# Patient Record
Sex: FEMALE | Race: WHITE | NOT HISPANIC OR LATINO | Employment: UNEMPLOYED | URBAN - METROPOLITAN AREA
[De-identification: names, ages, dates, MRNs, and addresses within clinical notes are randomized per-mention and may not be internally consistent; named-entity substitution may affect disease eponyms.]

---

## 2023-05-08 ENCOUNTER — OFFICE VISIT (OUTPATIENT)
Dept: PRIMARY CARE | Facility: CLINIC | Age: 83
End: 2023-05-08
Payer: MEDICARE

## 2023-05-08 VITALS
BODY MASS INDEX: 23.21 KG/M2 | HEIGHT: 63 IN | DIASTOLIC BLOOD PRESSURE: 63 MMHG | HEART RATE: 75 BPM | WEIGHT: 131 LBS | SYSTOLIC BLOOD PRESSURE: 105 MMHG | OXYGEN SATURATION: 98 %

## 2023-05-08 DIAGNOSIS — Z00.00 ROUTINE GENERAL MEDICAL EXAMINATION AT HEALTH CARE FACILITY: Primary | ICD-10-CM

## 2023-05-08 DIAGNOSIS — D69.6 THROMBOCYTOPENIA (CMS-HCC): ICD-10-CM

## 2023-05-08 DIAGNOSIS — C85.88 MARGINAL ZONE LYMPHOMA OF LYMPH NODES OF MULTIPLE SITES (MULTI): ICD-10-CM

## 2023-05-08 DIAGNOSIS — E55.9 VITAMIN D DEFICIENCY: ICD-10-CM

## 2023-05-08 DIAGNOSIS — I48.20 CHRONIC ATRIAL FIBRILLATION (MULTI): ICD-10-CM

## 2023-05-08 DIAGNOSIS — Z00.00 MEDICARE ANNUAL WELLNESS VISIT, SUBSEQUENT: ICD-10-CM

## 2023-05-08 DIAGNOSIS — Z12.31 ENCOUNTER FOR SCREENING MAMMOGRAM FOR BREAST CANCER: ICD-10-CM

## 2023-05-08 DIAGNOSIS — F41.9 ANXIETY: ICD-10-CM

## 2023-05-08 DIAGNOSIS — D61.818 PANCYTOPENIA (MULTI): ICD-10-CM

## 2023-05-08 DIAGNOSIS — E75.5 OTHER LIPID STORAGE DISORDERS: ICD-10-CM

## 2023-05-08 DIAGNOSIS — M71.20: ICD-10-CM

## 2023-05-08 DIAGNOSIS — G47.33 OBSTRUCTIVE SLEEP APNEA SYNDROME: ICD-10-CM

## 2023-05-08 DIAGNOSIS — R73.9 ELEVATED BLOOD SUGAR: ICD-10-CM

## 2023-05-08 DIAGNOSIS — M80.051D AGE-RELATED OSTEOPOROSIS WITH CURRENT PATHOL FRACTURE OF RIGHT FEMUR, WITH ROUTINE HEALING, SUBSEQUENT ENCOUNTER: ICD-10-CM

## 2023-05-08 PROBLEM — M85.80 OSTEOPENIA: Status: RESOLVED | Noted: 2023-05-08 | Resolved: 2023-05-08

## 2023-05-08 PROBLEM — R26.81 GAIT INSTABILITY: Status: ACTIVE | Noted: 2022-12-27

## 2023-05-08 PROBLEM — I48.91 ATRIAL FIBRILLATION (MULTI): Status: ACTIVE | Noted: 2023-05-08

## 2023-05-08 PROBLEM — E87.1 HYPONATREMIA: Status: ACTIVE | Noted: 2023-01-17

## 2023-05-08 PROBLEM — H93.13 BILATERAL TINNITUS: Status: ACTIVE | Noted: 2023-05-08

## 2023-05-08 PROBLEM — R59.0 MEDIASTINAL ADENOPATHY: Status: ACTIVE | Noted: 2023-02-24

## 2023-05-08 PROBLEM — H90.3 ASYMMETRICAL SENSORINEURAL HEARING LOSS: Status: ACTIVE | Noted: 2023-05-08

## 2023-05-08 PROBLEM — M17.0 PRIMARY OSTEOARTHRITIS OF BOTH KNEES: Status: ACTIVE | Noted: 2019-03-09

## 2023-05-08 PROBLEM — E04.1 NONTOXIC SINGLE THYROID NODULE: Status: ACTIVE | Noted: 2018-01-21

## 2023-05-08 PROBLEM — M54.2 CERVICALGIA: Status: ACTIVE | Noted: 2023-05-08

## 2023-05-08 PROBLEM — Z86.79 HISTORY OF PAROXYSMAL SUPRAVENTRICULAR TACHYCARDIA: Status: ACTIVE | Noted: 2023-05-08

## 2023-05-08 PROBLEM — M15.9 GENERALIZED OSTEOARTHRITIS: Status: ACTIVE | Noted: 2023-05-08

## 2023-05-08 PROBLEM — M51.36 DDD (DEGENERATIVE DISC DISEASE), LUMBAR: Status: ACTIVE | Noted: 2019-03-09

## 2023-05-08 PROBLEM — G43.909 MIGRAINE HEADACHE: Status: ACTIVE | Noted: 2023-05-08

## 2023-05-08 PROBLEM — J30.9 ALLERGIC RHINITIS: Status: ACTIVE | Noted: 2023-05-08

## 2023-05-08 PROBLEM — M51.369 DDD (DEGENERATIVE DISC DISEASE), LUMBAR: Status: ACTIVE | Noted: 2019-03-09

## 2023-05-08 PROBLEM — M85.80 OSTEOPENIA: Status: ACTIVE | Noted: 2023-05-08

## 2023-05-08 PROCEDURE — 1036F TOBACCO NON-USER: CPT | Performed by: FAMILY MEDICINE

## 2023-05-08 PROCEDURE — 99214 OFFICE O/P EST MOD 30 MIN: CPT | Performed by: FAMILY MEDICINE

## 2023-05-08 PROCEDURE — 1160F RVW MEDS BY RX/DR IN RCRD: CPT | Performed by: FAMILY MEDICINE

## 2023-05-08 PROCEDURE — G0439 PPPS, SUBSEQ VISIT: HCPCS | Performed by: FAMILY MEDICINE

## 2023-05-08 PROCEDURE — 1159F MED LIST DOCD IN RCRD: CPT | Performed by: FAMILY MEDICINE

## 2023-05-08 PROCEDURE — 1170F FXNL STATUS ASSESSED: CPT | Performed by: FAMILY MEDICINE

## 2023-05-08 RX ORDER — VIT C/E/ZN/COPPR/LUTEIN/ZEAXAN 250MG-90MG
CAPSULE ORAL
COMMUNITY

## 2023-05-08 RX ORDER — ACETAMINOPHEN 500 MG
TABLET ORAL 2 TIMES DAILY PRN
COMMUNITY

## 2023-05-08 RX ORDER — ARTIFICIAL TEARS 1; 2; 3 MG/ML; MG/ML; MG/ML
SOLUTION/ DROPS OPHTHALMIC
COMMUNITY

## 2023-05-08 RX ORDER — AMOXICILLIN 500 MG/1
CAPSULE ORAL
COMMUNITY
Start: 2021-06-18

## 2023-05-08 RX ORDER — POTASSIUM CITRATE 10 MEQ/1
10 TABLET, EXTENDED RELEASE ORAL DAILY
COMMUNITY
Start: 2022-12-12 | End: 2023-11-08 | Stop reason: SDUPTHER

## 2023-05-08 RX ORDER — CETIRIZINE HYDROCHLORIDE 10 MG/1
1 TABLET ORAL DAILY
COMMUNITY

## 2023-05-08 RX ORDER — MENTHOL 5.8 MG/1
100 LOZENGE ORAL DAILY
COMMUNITY
Start: 2023-01-30

## 2023-05-08 RX ORDER — ACETAMINOPHEN 500 MG
TABLET ORAL EVERY 6 HOURS PRN
COMMUNITY

## 2023-05-08 RX ORDER — MULTIVIT-MIN/FA/LYCOPEN/LUTEIN .4-300-25
TABLET ORAL
COMMUNITY
Start: 2015-11-24

## 2023-05-08 RX ORDER — FUROSEMIDE 20 MG/1
20 TABLET ORAL
COMMUNITY
Start: 2022-05-09 | End: 2023-11-08 | Stop reason: SDUPTHER

## 2023-05-08 RX ORDER — PAROXETINE 30 MG/1
30 TABLET, FILM COATED ORAL DAILY
COMMUNITY
End: 2023-06-30 | Stop reason: SDUPTHER

## 2023-05-08 ASSESSMENT — PATIENT HEALTH QUESTIONNAIRE - PHQ9
2. FEELING DOWN, DEPRESSED OR HOPELESS: NOT AT ALL
SUM OF ALL RESPONSES TO PHQ9 QUESTIONS 1 AND 2: 0
1. LITTLE INTEREST OR PLEASURE IN DOING THINGS: NOT AT ALL

## 2023-05-08 ASSESSMENT — ENCOUNTER SYMPTOMS
LOSS OF SENSATION IN FEET: 0
DEPRESSION: 0
OCCASIONAL FEELINGS OF UNSTEADINESS: 1

## 2023-05-08 ASSESSMENT — COLUMBIA-SUICIDE SEVERITY RATING SCALE - C-SSRS
6. HAVE YOU EVER DONE ANYTHING, STARTED TO DO ANYTHING, OR PREPARED TO DO ANYTHING TO END YOUR LIFE?: NO
2. HAVE YOU ACTUALLY HAD ANY THOUGHTS OF KILLING YOURSELF?: NO
1. IN THE PAST MONTH, HAVE YOU WISHED YOU WERE DEAD OR WISHED YOU COULD GO TO SLEEP AND NOT WAKE UP?: NO

## 2023-05-08 ASSESSMENT — ACTIVITIES OF DAILY LIVING (ADL)
DOING_HOUSEWORK: INDEPENDENT
DRESSING: INDEPENDENT
MANAGING_FINANCES: INDEPENDENT
GROCERY_SHOPPING: INDEPENDENT
TAKING_MEDICATION: INDEPENDENT
BATHING: INDEPENDENT

## 2023-05-08 NOTE — PATIENT INSTRUCTIONS

## 2023-05-08 NOTE — PROGRESS NOTES
"Subjective   Patient ID: Lela Virgen is a 83 y.o. female who presents for Medicare Annual Wellness Visit Subsequent.      HPI   The patient states that she is taking Paxil for her depression and anxiety, Furosemide for swelling and Xarelto for atrial fibrillation. She is taking these medications as prescribed and is tolerating them well. She is also following up with Hematology for her lymphoma. She states that she fell while using an escalator in December 2023 and injured her arm, but it is currently healing well. She also has some weakness in her lower extremities.    Review of Systems  Constitutional: No fever or chills, No Night Sweats  Eyes: No Blurry Vision or Eye sight problems  ENT: + Nasal Discharge, Hoarseness, sore throat  Cardiovascular: no chest pain, no palpitations and no syncope.   Respiratory: no cough, no shortness of breath during exertion and no shortness of breath at rest.   Gastrointestinal: no abdominal pain, no nausea and no vomiting.   : No vaginal discharge, burning with urination, no blood in urine or stools  Skin: No Skin rashes or Lesions  Neuro: No Headache, no dizziness or Numbness or tingling  Psych: No Anxiety, depression or sleeping problems  Heme: No Easy bleeding or brusing.   MSK: + bilateral weakness of legs.    Objective   /63   Pulse 75   Ht 1.607 m (5' 3.25\")   Wt 59.4 kg (131 lb)   SpO2 98%   BMI 23.02 kg/m²     Physical Exam  Patient declined Chaperone  Constitutional: Alert and in no acute distress. Well developed, well nourished.   Head and Face: Head and face: Normal.    Eyes: Normal external exam. Pupils were equal in size, round, reactive to light (PERRL) with normal accommodation and extraocular movements intact (EOMI).   Ears, Nose, Mouth, and Throat: External inspection of ears and nose: Normal.  Hearing: Normal.  Nasal mucosa, septum, and turbinates: Normal.  Lips, teeth, and gums: Normal.  Oropharynx: Normal.   Neck: No neck mass was observed. " Supple. Thyroid not enlarged and there were no palpable thyroid nodules.   Cardiovascular: Heart rate and rhythm were normal, normal S1 and S2. Pedal pulses: Normal. No peripheral edema.   Pulmonary: No respiratory distress. Clear bilateral breath sounds.   Breast: Left breast absent. Right breast: Normal Appearance, No Masses or lumps palpated  Abdomen: Soft nontender; no abdominal mass palpated. Normal bowel sounds. No organomegaly.   Musculoskeletal: Bilateral baker cyst noted. No joint swelling seen, normal movements of all extremities. Range of motion: Normal.  Muscle strength/tone: Normal.    Skin: Normal skin color and pigmentation, normal skin turgor, and no rash.   Neurologic: Deep tendon reflexes were 2+ and symmetric.   Psychiatric: Judgment and insight: Intact. Mood and affect: Normal.  Lymphatic: No cervical lymphadenopathy. Palpation of lymph nodes in axillae: Normal.  Palpation of lymph nodes in groin: Normal.    Lab Results   Component Value Date    WBC 3.7 (L) 09/12/2022    HGB 12.9 09/12/2022    HCT 40.8 09/12/2022     (L) 09/12/2022    CHOL 159 05/16/2022    TRIG 77 05/16/2022    HDL 44.5 05/16/2022    ALT 15 05/16/2022    AST 20 05/16/2022     05/16/2022    K 4.4 05/16/2022     05/16/2022    CREATININE 0.67 05/16/2022    BUN 17 05/16/2022    CO2 30 05/16/2022    TSH 1.71 05/16/2022    INR 1.0 04/06/2020       Electrocardiogram 12 Lead  Normal sinus rhythm  Right bundle branch block  Abnormal ECG  No previous ECGs available  Confirmed by JESUS WHITE MD ([id number]) on 4/11/2020 4:41:19 PM      Assessment/Plan   Diagnoses and all orders for this visit:  Routine general medical examination at health care facility Medicare annual wellness visit, subsequent  Anxiety  Pancytopenia (CMS/HCC)  -     Follow Up In [facility] - PCP; Future  Encounter for screening mammogram for breast cancer  -     BI mammo bilateral screening tomosynthesis; Future  Obstructive sleep apnea  syndrome  -     Follow Up In [facility] - PCP; Future  Chronic atrial fibrillation (CMS/HCC)  -     TSH with reflex to Free T4 if abnormal; Future  Age-related osteoporosis with current pathol fracture of right femur, with routine healing, subsequent encounter  -     Vitamin B12; Future  -     Vitamin D, Total; Future  Thrombocytopenia (CMS/HCC)  -     Ferritin; Future  -     Iron and TIBC; Future  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 Below are the things we discussed today:    1. 1. Immunizations: Yearly Flu shot is recommended. Up to date          a: COVID: Patient will get booster in August 2023          b: Tetanus: Up-to-date         c: Shingrix: Up-to-date         d: Pneumovax: Up-to-date         e: Prevnar: Up-to-date    2. Blood Work: Ordered   3. Seen your dentist twice a year  4. Yearly Eye exam is recommended    5. BMI: Normal  6: Diet recommendations:   Eat Clean, Try to have as many home cooked meals as possible  Avoid processed foods which contain excess calories, sugar, and sodium.    7. Exercise recommendations:   150 minutes a week to maintain your weight     If you have to loose weight, you need a better diet and exercise plan.     8. Supplements recommended:  a - Calcium 600 mg up to twice a day to get a total of 1200 mg. Each 8 oz of milk or yogurt or 1 oz of cheese, 1 Banana, 1 serving of green Leafy vegetable has about 300 mg of Calcium, so you may subtract that amount. Calcium citrate is the only acceptable supplement to take if you take an acid suppressing  medication like Prilosec; otherwise Calcium carbonate is acceptable too (It can cause Constipation).   b - Vitamin D - 2000 IU daily     9. Please get your Living will / Advance directive completed if you do not have one already. Please make sure our office has a copy of the latest one.     10. Colonoscopy: Not recommended   11. Mammogram: Ordered  12. PAP: Not indicated    13. DEXA: Up to date   14: Skin Check: Please see Dermatology once a year for a Skin Check.     15. Depression and anxiety: Continue Paxil.    16. Atrial fibrillation: Continue Xarelto.    17. Swelling: Continue Furosemide.    18. Lymphoma: Continue following up with Hematology.     19. Bilateral baker's cyst: Advised the patient to follow up with Orthopedic Surgery. Physical therapy referral.     Follow up in 6 months.    Follow up in one year for a Physical. Please call the office before your Physical to see if you need blood work completed prior to your physical.     Please call me if any questions arise from now until your next visit. I will call you after I am done seeing patients. A Doctor is always available by phone when the office is closed. Please feel free to call for help with any problem that you feel shouldn't wait until the office re-opens.     Scribe Attestation  By signing my name below, IMariann Scribe   attest that this documentation has been prepared under the direction and in the presence of Magy Sam MD.

## 2023-05-09 ENCOUNTER — LAB (OUTPATIENT)
Dept: LAB | Facility: LAB | Age: 83
End: 2023-05-09
Payer: MEDICARE

## 2023-05-09 DIAGNOSIS — M80.051D AGE-RELATED OSTEOPOROSIS WITH CURRENT PATHOL FRACTURE OF RIGHT FEMUR, WITH ROUTINE HEALING, SUBSEQUENT ENCOUNTER: ICD-10-CM

## 2023-05-09 DIAGNOSIS — I48.20 CHRONIC ATRIAL FIBRILLATION (MULTI): ICD-10-CM

## 2023-05-09 DIAGNOSIS — E75.5 OTHER LIPID STORAGE DISORDERS: ICD-10-CM

## 2023-05-09 DIAGNOSIS — E55.9 VITAMIN D DEFICIENCY: ICD-10-CM

## 2023-05-09 DIAGNOSIS — D69.6 THROMBOCYTOPENIA (CMS-HCC): ICD-10-CM

## 2023-05-09 DIAGNOSIS — R73.9 ELEVATED BLOOD SUGAR: ICD-10-CM

## 2023-05-09 LAB
CALCIDIOL (25 OH VITAMIN D3) (NG/ML) IN SER/PLAS: 40 NG/ML
CHOLESTEROL (MG/DL) IN SER/PLAS: 168 MG/DL (ref 0–199)
CHOLESTEROL IN HDL (MG/DL) IN SER/PLAS: 44.5 MG/DL
CHOLESTEROL/HDL RATIO: 3.8
COBALAMIN (VITAMIN B12) (PG/ML) IN SER/PLAS: 409 PG/ML (ref 211–911)
ESTIMATED AVERAGE GLUCOSE FOR HBA1C: 111 MG/DL
FERRITIN (UG/LL) IN SER/PLAS: 81 UG/L (ref 8–150)
HEMOGLOBIN A1C/HEMOGLOBIN TOTAL IN BLOOD: 5.5 %
IRON (UG/DL) IN SER/PLAS: 43 UG/DL (ref 35–150)
IRON BINDING CAPACITY (UG/DL) IN SER/PLAS: 419 UG/DL (ref 240–445)
IRON SATURATION (%) IN SER/PLAS: 10 % (ref 25–45)
LDL: 101 MG/DL (ref 0–99)
THYROTROPIN (MIU/L) IN SER/PLAS BY DETECTION LIMIT <= 0.05 MIU/L: 2.17 MIU/L (ref 0.44–3.98)
TRIGLYCERIDE (MG/DL) IN SER/PLAS: 113 MG/DL (ref 0–149)
VLDL: 23 MG/DL (ref 0–40)

## 2023-05-09 PROCEDURE — 84443 ASSAY THYROID STIM HORMONE: CPT

## 2023-05-09 PROCEDURE — 82607 VITAMIN B-12: CPT

## 2023-05-09 PROCEDURE — 80061 LIPID PANEL: CPT

## 2023-05-09 PROCEDURE — 83036 HEMOGLOBIN GLYCOSYLATED A1C: CPT

## 2023-05-09 PROCEDURE — 83540 ASSAY OF IRON: CPT

## 2023-05-09 PROCEDURE — 36415 COLL VENOUS BLD VENIPUNCTURE: CPT

## 2023-05-09 PROCEDURE — 83550 IRON BINDING TEST: CPT

## 2023-05-09 PROCEDURE — 82306 VITAMIN D 25 HYDROXY: CPT

## 2023-05-09 PROCEDURE — 82728 ASSAY OF FERRITIN: CPT

## 2023-05-10 DIAGNOSIS — N63.10 MASS OF RIGHT BREAST, UNSPECIFIED QUADRANT: Primary | ICD-10-CM

## 2023-06-30 DIAGNOSIS — F41.9 ANXIETY: Primary | ICD-10-CM

## 2023-06-30 RX ORDER — PAROXETINE 30 MG/1
30 TABLET, FILM COATED ORAL DAILY
Qty: 30 TABLET | Refills: 0 | Status: SHIPPED | OUTPATIENT
Start: 2023-06-30 | End: 2023-07-25

## 2023-07-06 ENCOUNTER — APPOINTMENT (OUTPATIENT)
Dept: PRIMARY CARE | Facility: CLINIC | Age: 83
End: 2023-07-06
Payer: MEDICARE

## 2023-07-11 ENCOUNTER — OFFICE VISIT (OUTPATIENT)
Dept: PRIMARY CARE | Facility: CLINIC | Age: 83
End: 2023-07-11
Payer: MEDICARE

## 2023-07-11 VITALS
WEIGHT: 133 LBS | HEART RATE: 75 BPM | BODY MASS INDEX: 23.57 KG/M2 | OXYGEN SATURATION: 97 % | DIASTOLIC BLOOD PRESSURE: 65 MMHG | SYSTOLIC BLOOD PRESSURE: 112 MMHG | HEIGHT: 63 IN

## 2023-07-11 DIAGNOSIS — F41.9 ANXIETY: ICD-10-CM

## 2023-07-11 DIAGNOSIS — R92.30 DENSE BREAST TISSUE ON MAMMOGRAM: ICD-10-CM

## 2023-07-11 DIAGNOSIS — I48.20 CHRONIC ATRIAL FIBRILLATION (MULTI): Primary | ICD-10-CM

## 2023-07-11 PROCEDURE — 1036F TOBACCO NON-USER: CPT | Performed by: FAMILY MEDICINE

## 2023-07-11 PROCEDURE — 1159F MED LIST DOCD IN RCRD: CPT | Performed by: FAMILY MEDICINE

## 2023-07-11 PROCEDURE — 99214 OFFICE O/P EST MOD 30 MIN: CPT | Performed by: FAMILY MEDICINE

## 2023-07-11 PROCEDURE — 1160F RVW MEDS BY RX/DR IN RCRD: CPT | Performed by: FAMILY MEDICINE

## 2023-07-11 ASSESSMENT — PATIENT HEALTH QUESTIONNAIRE - PHQ9
2. FEELING DOWN, DEPRESSED OR HOPELESS: NOT AT ALL
1. LITTLE INTEREST OR PLEASURE IN DOING THINGS: NOT AT ALL
SUM OF ALL RESPONSES TO PHQ9 QUESTIONS 1 AND 2: 0

## 2023-07-11 ASSESSMENT — ENCOUNTER SYMPTOMS
DEPRESSION: 0
LOSS OF SENSATION IN FEET: 0
OCCASIONAL FEELINGS OF UNSTEADINESS: 1

## 2023-07-11 NOTE — PROGRESS NOTES
"Subjective   Patient ID: Lela Virgen is a 83 y.o. female who presents for Follow-up.    HPI   The patient reports that she is taking Xarelto for her atrial fibrillation, Paxil for her depression and anxiety, Furosemide for edema. She is taking these medications as prescribed and is tolerating them well. Her blood work showed no concerns.    Review of Systems  Constitutional: No fever or chills  Cardiovascular: no chest pain, no palpitations and no syncope.   Respiratory: no cough, no shortness of breath during exertion and no shortness of breath at rest.   Gastrointestinal: no abdominal pain, no nausea and no vomiting.  Neuro: No Headache, no dizziness    Objective   /65   Pulse 75   Ht 1.607 m (5' 3.25\")   Wt 60.3 kg (133 lb)   SpO2 97%   BMI 23.37 kg/m²     Physical Exam  Constitutional: Alert and in no acute distress. Well developed, well nourished  Head and Face: Head and face: Normal.    Cardiovascular: Heart rate and rhythm were normal, normal S1 and S2. No peripheral edema.   Pulmonary: No respiratory distress. Clear bilateral breath sounds.  Musculoskeletal: Gait and station: Normal. Muscle strength/tone: Normal.   Skin: Normal skin color and pigmentation, normal skin turgor, and no rash.    Psychiatric: Judgment and insight: Intact. Mood and affect: Normal.    Lab Results   Component Value Date    WBC 3.7 (L) 09/12/2022    HGB 12.9 09/12/2022    HCT 40.8 09/12/2022     (L) 09/12/2022    CHOL 168 05/09/2023    TRIG 113 05/09/2023    HDL 44.5 05/09/2023    ALT 15 05/16/2022    AST 20 05/16/2022     05/16/2022    K 4.4 05/16/2022     05/16/2022    CREATININE 0.67 05/16/2022    BUN 17 05/16/2022    CO2 30 05/16/2022    TSH 2.17 05/09/2023    INR 1.0 04/06/2020    HGBA1C 5.5 05/09/2023           Assessment/Plan   Diagnoses and all orders for this visit:  Chronic atrial fibrillation (CMS/HCC)  Anxiety  Dense breast tissue on mammogram  -     Referral to Breast Surgery; " Future        Dear Lela Virgen     It was my pleasure to take care of you today in the office. Below are the things we discussed today:    1. Depression and anxiety: Continue Paxil.     2. Atrial fibrillation: Continue Xarelto.     3. Swelling: Continue Furosemide.     4. Dense breast tissue on mammogram: Breast surgery referral.     Follow up in 4 months.    Your yearly Physical is due in: May 2024  When you call the office for your yearly Physical, please ask them to inform me to order your blood work, so that you can get the fasting blood work before your appointment and we can discuss the results at your physical.      Please call me if any questions arise from now until your next visit. I will call you after I am done seeing patients. A Doctor is always available by phone when the office is closed. Please feel free to call for help with any problem that you feel shouldn't wait until the office re-opens.     Scribe Attestation  By signing my name below, IMariann Scribe   attest that this documentation has been prepared under the direction and in the presence of Magy Sam MD.

## 2023-07-24 DIAGNOSIS — F41.9 ANXIETY: ICD-10-CM

## 2023-07-25 RX ORDER — PAROXETINE 30 MG/1
30 TABLET, FILM COATED ORAL DAILY
Qty: 90 TABLET | Refills: 1 | Status: SHIPPED | OUTPATIENT
Start: 2023-07-25 | End: 2023-11-08 | Stop reason: SDUPTHER

## 2023-09-27 DIAGNOSIS — R60.0 LOCALIZED EDEMA: ICD-10-CM

## 2023-09-27 RX ORDER — FUROSEMIDE 40 MG/1
40 TABLET ORAL DAILY
Qty: 90 TABLET | Refills: 0 | Status: SHIPPED | OUTPATIENT
Start: 2023-09-27 | End: 2023-11-08 | Stop reason: SDUPTHER

## 2023-11-08 ENCOUNTER — OFFICE VISIT (OUTPATIENT)
Dept: PRIMARY CARE | Facility: CLINIC | Age: 83
End: 2023-11-08
Payer: MEDICARE

## 2023-11-08 VITALS
SYSTOLIC BLOOD PRESSURE: 119 MMHG | WEIGHT: 132 LBS | BODY MASS INDEX: 23.39 KG/M2 | DIASTOLIC BLOOD PRESSURE: 55 MMHG | HEIGHT: 63 IN | HEART RATE: 81 BPM

## 2023-11-08 DIAGNOSIS — E55.9 VITAMIN D DEFICIENCY: ICD-10-CM

## 2023-11-08 DIAGNOSIS — D61.818 PANCYTOPENIA (MULTI): ICD-10-CM

## 2023-11-08 DIAGNOSIS — I48.0 PAROXYSMAL ATRIAL FIBRILLATION (MULTI): Primary | ICD-10-CM

## 2023-11-08 DIAGNOSIS — Z23 ENCOUNTER FOR IMMUNIZATION: ICD-10-CM

## 2023-11-08 DIAGNOSIS — F41.9 ANXIETY: ICD-10-CM

## 2023-11-08 DIAGNOSIS — M79.89 LEG SWELLING: ICD-10-CM

## 2023-11-08 DIAGNOSIS — G47.33 OBSTRUCTIVE SLEEP APNEA SYNDROME: ICD-10-CM

## 2023-11-08 DIAGNOSIS — R73.9 ELEVATED BLOOD SUGAR: ICD-10-CM

## 2023-11-08 DIAGNOSIS — E75.5 OTHER LIPID STORAGE DISORDERS: ICD-10-CM

## 2023-11-08 DIAGNOSIS — R94.6 ABNORMAL THYROID EXAM: ICD-10-CM

## 2023-11-08 PROCEDURE — 1159F MED LIST DOCD IN RCRD: CPT | Performed by: FAMILY MEDICINE

## 2023-11-08 PROCEDURE — 1036F TOBACCO NON-USER: CPT | Performed by: FAMILY MEDICINE

## 2023-11-08 PROCEDURE — 1160F RVW MEDS BY RX/DR IN RCRD: CPT | Performed by: FAMILY MEDICINE

## 2023-11-08 PROCEDURE — 90662 IIV NO PRSV INCREASED AG IM: CPT | Performed by: FAMILY MEDICINE

## 2023-11-08 PROCEDURE — G0008 ADMIN INFLUENZA VIRUS VAC: HCPCS | Performed by: FAMILY MEDICINE

## 2023-11-08 PROCEDURE — 99214 OFFICE O/P EST MOD 30 MIN: CPT | Performed by: FAMILY MEDICINE

## 2023-11-08 RX ORDER — FUROSEMIDE 20 MG/1
20 TABLET ORAL
Qty: 90 TABLET | Refills: 1 | Status: SHIPPED | OUTPATIENT
Start: 2023-11-08

## 2023-11-08 RX ORDER — POTASSIUM CITRATE 10 MEQ/1
10 TABLET, EXTENDED RELEASE ORAL DAILY
Qty: 90 TABLET | Refills: 1 | Status: SHIPPED | OUTPATIENT
Start: 2023-11-08

## 2023-11-08 RX ORDER — PAROXETINE 30 MG/1
30 TABLET, FILM COATED ORAL DAILY
Qty: 90 TABLET | Refills: 1 | Status: SHIPPED | OUTPATIENT
Start: 2023-11-08 | End: 2024-04-30 | Stop reason: SDUPTHER

## 2023-11-08 ASSESSMENT — PATIENT HEALTH QUESTIONNAIRE - PHQ9
1. LITTLE INTEREST OR PLEASURE IN DOING THINGS: NOT AT ALL
2. FEELING DOWN, DEPRESSED OR HOPELESS: NOT AT ALL
SUM OF ALL RESPONSES TO PHQ9 QUESTIONS 1 AND 2: 0

## 2023-11-08 NOTE — PROGRESS NOTES
"Subjective   Patient ID: Nilsa Virgen is a 83 y.o. female who presents for Depression.    HPI   The patient reports that she is taking Paxil for her depression and anxiety, Xarelto for atrial fibrillation and furosemide for edema. She is taking these medications as prescribed and has no side effects from them. She complains of a lump on her breast.    Review of Systems  Constitutional: No fever or chills  Cardiovascular: no chest pain, no palpitations and no syncope.   Respiratory: no cough, no shortness of breath during exertion and no shortness of breath at rest.   Gastrointestinal: no abdominal pain, no nausea and no vomiting.  Neuro: No Headache, no dizziness    Objective   /55   Pulse 81   Ht 1.607 m (5' 3.25\")   Wt 59.9 kg (132 lb)   BMI 23.20 kg/m²     Physical Exam  Constitutional: Alert and in no acute distress. Well developed, well nourished  Head and Face: Head and face: Normal.    Cardiovascular: Heart rate and rhythm were normal, normal S1 and S2. No peripheral edema.   Pulmonary: No respiratory distress. Clear bilateral breath sounds.  Musculoskeletal: Gait and station: Normal. Muscle strength/tone: Normal.   Skin: Normal skin color and pigmentation, normal skin turgor, and no rash.    Psychiatric: Judgment and insight: Intact. Mood and affect: Normal.    Lab Results   Component Value Date    WBC 3.7 (L) 09/12/2022    HGB 12.9 09/12/2022    HCT 40.8 09/12/2022     (L) 09/12/2022    CHOL 168 05/09/2023    TRIG 113 05/09/2023    HDL 44.5 05/09/2023    ALT 15 05/16/2022    AST 20 05/16/2022     05/16/2022    K 4.4 05/16/2022     05/16/2022    CREATININE 0.67 05/16/2022    BUN 17 05/16/2022    CO2 30 05/16/2022    TSH 2.17 05/09/2023    INR 1.0 04/06/2020    HGBA1C 5.5 05/09/2023       US limited breast  Narrative: Interpreted By:  LIZETTE BAJWA MD  MRN: [id number]  Patient Name: NILSA VIRGEN     STUDY:  DIGITAL DIAG MAMM RIGHT UNIL W/ DERECK;  5/12/2023 11:12 am   "   ACCESSION NUMBER(S):  [id number]     ORDERING CLINICIAN:  MAIDA BEAR     INDICATION:  birads - 0. Patient was recalled from screening mammogram, for  asymmetry seen within the right breast.     COMPARISON:  Screening mammogram dated 05/09/2023, 01/14/2021, 01/13/2020,  12/31/2018 12/29/2017     FINDINGS:  MAMMOGRAPHY: 2D and tomosynthesis images were reviewed at 1 mm slice  thickness.     The breast tissue is heterogeneously dense, which may obscure small  masses.    Previously seen asymmetry resolves on additional imaging,  however due to the increased density in that location, ultrasound was  performed.     ULTRASOUND:  Targeted ultrasound was performed with without  elastography by the registered ultrasound technologist.     Impression: No mammographic evidence of malignancy. Screening mammography in 1  year is recommended.     BI-RADS CATEGORY:     Category: 1 - Negative.  Recommendation: 1 Year Screening.     For any future breast imaging appointments, please call [phone]).     Patient letter sent SNORM     BI digital DIAG mamm  Narrative: Interpreted By:  LIZETTE BAJWA MD  MRN: [id number]  Patient Name: NILSA COLON     STUDY:  DIGITAL DIAG MAMM RIGHT UNIL W/ DERECK;  5/12/2023 11:12 am     ACCESSION NUMBER(S):  [id number]     ORDERING CLINICIAN:  MAIDA BEAR     INDICATION:  birads - 0. Patient was recalled from screening mammogram, for  asymmetry seen within the right breast.     COMPARISON:  Screening mammogram dated 05/09/2023, 01/14/2021, 01/13/2020,  12/31/2018 12/29/2017     FINDINGS:  MAMMOGRAPHY: 2D and tomosynthesis images were reviewed at 1 mm slice  thickness.     The breast tissue is heterogeneously dense, which may obscure small  masses.    Previously seen asymmetry resolves on additional imaging,  however due to the increased density in that location, ultrasound was  performed.     ULTRASOUND:  Targeted ultrasound was performed with without  elastography by the registered  ultrasound technologist.     Impression: No mammographic evidence of malignancy. Screening mammography in 1  year is recommended.     BI-RADS CATEGORY:     Category: 1 - Negative.  Recommendation: 1 Year Screening.     For any future breast imaging appointments, please call [phone]  ([phone]).     Patient letter sent SNORM         Assessment/Plan   Diagnoses and all orders for this visit:  Paroxysmal atrial fibrillation (CMS/HCC)  -     Follow Up In [facility] - PCP - Medicare Annual; Future  -     Comprehensive Metabolic Panel; Future  Pancytopenia (CMS/HCC)  -     Follow Up In [facility] - PCP  -     CBC; Future  Obstructive sleep apnea syndrome  -     Follow Up In [facility] - PCP  Encounter for immunization  -     Flu vaccine, quadrivalent, high-dose, preservative free, age 65y+ (FLUZONE)  Vitamin D deficiency  -     Vitamin B12; Future  -     Vitamin D 25-Hydroxy,Total (for eval of Vitamin D levels); Future  Other lipid storage disorders  -     Lipid Panel; Future  Elevated blood sugar  -     Hemoglobin A1C; Future  Abnormal thyroid exam  -     TSH with reflex to Free T4 if abnormal; Future  Anxiety  -     PARoxetine (Paxil) 30 mg tablet; Take 1 tablet (30 mg) by mouth once daily.  Leg swelling  -     potassium citrate CR (Urocit-K-10) 10 mEq ER tablet; Take 1 tablet (10 mEq) by mouth once daily.  -     furosemide (Lasix) 20 mg tablet; Take 1 tablet (20 mg) by mouth once daily.        Dear Lela Virgen     It was my pleasure to take care of you today in the office. Below are the things we discussed today:    1. Depression and anxiety: Continue Paxil.    2. Atrial fibrillation: Continue Xarelto.     3. Edema: Continue furosemide.    4. COVID: Please get booster from the pharmacy.     5. Breast lump: Advised the patient that this is scar tissue.    6. Flu shot: Given today.    Your yearly Physical is due in: May 2024  When you call the office for your yearly Physical, please  ask them to inform me to order your blood work, so that you can get the fasting blood work before your appointment and we can discuss the results at your physical.      Please call me if any questions arise from now until your next visit. I will call you after I am done seeing patients. A Doctor is always available by phone when the office is closed. Please feel free to call for help with any problem that you feel shouldn't wait until the office re-opens.     Scribe Attestation  By signing my name below, I, Trevin Tolentino   attest that this documentation has been prepared under the direction and in the presence of Magy Sam MD.

## 2024-03-19 ENCOUNTER — CLINICAL SUPPORT (OUTPATIENT)
Dept: AUDIOLOGY | Facility: CLINIC | Age: 84
End: 2024-03-19
Payer: MEDICARE

## 2024-03-19 DIAGNOSIS — H93.13 BILATERAL TINNITUS: ICD-10-CM

## 2024-03-19 DIAGNOSIS — H90.3 ASYMMETRICAL SENSORINEURAL HEARING LOSS: Primary | ICD-10-CM

## 2024-03-19 PROCEDURE — 92550 TYMPANOMETRY & REFLEX THRESH: CPT | Performed by: AUDIOLOGIST

## 2024-03-19 PROCEDURE — 92557 COMPREHENSIVE HEARING TEST: CPT | Performed by: AUDIOLOGIST

## 2024-03-19 NOTE — PROGRESS NOTES
Chief Complaint   Patient presents with    Hearing Loss     HISTORY:  Lela Virgen, age 83 years, was seen for audiogram on 3/19/2024.  Ms. Virgen presents with documented bilateral asymmetrical sensorineural hearing loss with right ear worse than the left ear.  It has been years since her last audiogram.  Ms. Virgen continues to utilize binaural phonak audeo B50R hearing aids serial numbers [id number] and [id number].  Both aids are out of warranty as of 1/3/2021.  Ms. Virgen feels her hearing has decreased since her last audiogram.  There is no ear pain, ear pressure or middle ear pathology.  Please see medical records for complete history.    RESULTS:  Prior to testing both external auditory canals were clear and tympanic membranes visualized    Immittance and acoustic reflexes:  Immittance testing yielded TYPE A tympanograms indicating normal middle ear function both ears  Acoustic reflexes were absent 500 - 4000 Hz both ears    Audiogram:  Normal hearing levels were obtained 250 Hz with a mild to moderate sensorineural hearing loss 500 - 8000 Hz left ear  Mild to moderate sensorineural hearing loss 250 -8000 Hz right ear  Speech reception thresholds obtained at 55 dBHL right ear and 35 dBHL left ear  Speech discrimination scores were 100% both ears    IMPRESSIONS:  Asymmetrical sensorineural hearing loss with the right ear worse than the left ear  Hearing aids are in good working condition with proper gain    RECOMMENDATIONS:  1.  Daily use of hearing aids  2.  Retest hearing levels annually    time: 1520 - 1543

## 2024-04-04 ENCOUNTER — TELEPHONE (OUTPATIENT)
Dept: PRIMARY CARE | Facility: CLINIC | Age: 84
End: 2024-04-04
Payer: MEDICARE

## 2024-04-04 DIAGNOSIS — B37.31 VAGINAL CANDIDIASIS: Primary | ICD-10-CM

## 2024-04-04 RX ORDER — FLUCONAZOLE 150 MG/1
150 TABLET ORAL ONCE
Qty: 1 TABLET | Refills: 0 | Status: SHIPPED | OUTPATIENT
Start: 2024-04-04 | End: 2024-04-04

## 2024-04-11 ENCOUNTER — TELEPHONE (OUTPATIENT)
Dept: PRIMARY CARE | Facility: CLINIC | Age: 84
End: 2024-04-11
Payer: MEDICARE

## 2024-04-22 ENCOUNTER — TELEPHONE (OUTPATIENT)
Dept: PRIMARY CARE | Facility: CLINIC | Age: 84
End: 2024-04-22
Payer: MEDICARE

## 2024-04-22 DIAGNOSIS — R30.0 DYSURIA: Primary | ICD-10-CM

## 2024-04-23 ENCOUNTER — LAB (OUTPATIENT)
Dept: LAB | Facility: LAB | Age: 84
End: 2024-04-23
Payer: MEDICARE

## 2024-04-23 ENCOUNTER — OFFICE VISIT (OUTPATIENT)
Dept: PRIMARY CARE | Facility: CLINIC | Age: 84
End: 2024-04-23
Payer: MEDICARE

## 2024-04-23 VITALS
OXYGEN SATURATION: 98 % | WEIGHT: 130 LBS | HEIGHT: 63 IN | HEART RATE: 70 BPM | BODY MASS INDEX: 23.04 KG/M2 | DIASTOLIC BLOOD PRESSURE: 63 MMHG | SYSTOLIC BLOOD PRESSURE: 118 MMHG

## 2024-04-23 DIAGNOSIS — R30.0 DYSURIA: ICD-10-CM

## 2024-04-23 DIAGNOSIS — N89.8 VAGINAL DISCHARGE: Primary | ICD-10-CM

## 2024-04-23 LAB
APPEARANCE UR: CLEAR
BILIRUB UR STRIP.AUTO-MCNC: NEGATIVE MG/DL
COLOR UR: NORMAL
GLUCOSE UR STRIP.AUTO-MCNC: NORMAL MG/DL
KETONES UR STRIP.AUTO-MCNC: NEGATIVE MG/DL
LEUKOCYTE ESTERASE UR QL STRIP.AUTO: NEGATIVE
NITRITE UR QL STRIP.AUTO: NEGATIVE
PH UR STRIP.AUTO: 5 [PH]
PROT UR STRIP.AUTO-MCNC: NEGATIVE MG/DL
RBC # UR STRIP.AUTO: NEGATIVE /UL
SP GR UR STRIP.AUTO: 1.02
UROBILINOGEN UR STRIP.AUTO-MCNC: NORMAL MG/DL

## 2024-04-23 PROCEDURE — 1036F TOBACCO NON-USER: CPT | Performed by: FAMILY MEDICINE

## 2024-04-23 PROCEDURE — 1160F RVW MEDS BY RX/DR IN RCRD: CPT | Performed by: FAMILY MEDICINE

## 2024-04-23 PROCEDURE — 99213 OFFICE O/P EST LOW 20 MIN: CPT | Performed by: FAMILY MEDICINE

## 2024-04-23 PROCEDURE — 1159F MED LIST DOCD IN RCRD: CPT | Performed by: FAMILY MEDICINE

## 2024-04-23 PROCEDURE — 81003 URINALYSIS AUTO W/O SCOPE: CPT

## 2024-04-23 PROCEDURE — 87086 URINE CULTURE/COLONY COUNT: CPT

## 2024-04-23 PROCEDURE — 87205 SMEAR GRAM STAIN: CPT

## 2024-04-23 NOTE — PROGRESS NOTES
"Subjective   Patient ID: Lela Virgen is a 83 y.o. female who presents for Vaginitis/Bacterial Vaginosis.    HPI   The patient reports that she 3 days ago she noticed hematuria. She reached out to the on-call doctor on this day however, did not get a call back. Her symptoms did not improve and she is now experiencing vaingal discharge, she thinks it may be bacterial vaginosis.    Review of Systems  Constitutional: No fever or chills  Cardiovascular: no chest pain, no palpitations and no syncope.   Respiratory: no cough, no shortness of breath during exertion and no shortness of breath at rest.   Gastrointestinal: no abdominal pain, no nausea and no vomiting.  : + vaginal discharge.  Neuro: No Headache, no dizziness    Objective   /63   Pulse 70   Ht 1.607 m (5' 3.25\")   Wt 59 kg (130 lb)   SpO2 98%   BMI 22.85 kg/m²     Physical Exam  Constitutional: Alert and in no acute distress. Well developed, well nourished  Head and Face: Head and face: Normal.    Cardiovascular: Heart rate and rhythm were normal, normal S1 and S2. No peripheral edema.   Pulmonary: No respiratory distress. Clear bilateral breath sounds.  Musculoskeletal: Gait and station: Normal. Muscle strength/tone: Normal.   Skin: Normal skin color and pigmentation, normal skin turgor, and no rash.    Psychiatric: Judgment and insight: Intact. Mood and affect: Normal.    Lab Results   Component Value Date    WBC 3.7 (L) 09/12/2022    HGB 12.9 09/12/2022    HCT 40.8 09/12/2022     (L) 09/12/2022    CHOL 168 05/09/2023    TRIG 113 05/09/2023    HDL 44.5 05/09/2023    ALT 15 05/16/2022    AST 20 05/16/2022     05/16/2022    K 4.4 05/16/2022     05/16/2022    CREATININE 0.67 05/16/2022    BUN 17 05/16/2022    CO2 30 05/16/2022    TSH 2.17 05/09/2023    INR 1.0 04/06/2020    HGBA1C 5.5 05/09/2023           Assessment/Plan   Diagnoses and all orders for this visit:  Vaginal discharge  -     Vaginitis Gram Stain For Bacterial " Vaginosis + Yeast        Dear Lela Virgen     It was my pleasure to take care of you today in the office. Below are the things we discussed today:    1. Vaginal discharge: Vaginal swab done.    2. Hematuria - check urine    Your yearly Physical is due in: May 2024  When you call the office for your yearly Physical, please ask them to inform me to order your blood work, so that you can get the fasting blood work before your appointment and we can discuss the results at your physical.      Please call me if any questions arise from now until your next visit. I will call you after I am done seeing patients. A Doctor is always available by phone when the office is closed. Please feel free to call for help with any problem that you feel shouldn't wait until the office re-opens.     Scribe Attestation  By signing my name below, IMariann, Trevin   attest that this documentation has been prepared under the direction and in the presence of Magy Sam MD.

## 2024-04-24 DIAGNOSIS — N76.0 BV (BACTERIAL VAGINOSIS): ICD-10-CM

## 2024-04-24 DIAGNOSIS — B96.89 BV (BACTERIAL VAGINOSIS): Primary | ICD-10-CM

## 2024-04-24 DIAGNOSIS — B96.89 BV (BACTERIAL VAGINOSIS): ICD-10-CM

## 2024-04-24 DIAGNOSIS — N76.0 BV (BACTERIAL VAGINOSIS): Primary | ICD-10-CM

## 2024-04-24 LAB
BACTERIA UR CULT: NORMAL
BACTERIAL VAGINOSIS VAG-IMP: NORMAL
CLUE CELLS VAG LPF-#/AREA: NORMAL /[LPF]
NUGENT SCORE: 4
YEAST VAG WET PREP-#/AREA: NORMAL

## 2024-04-24 RX ORDER — METRONIDAZOLE 7.5 MG/G
1 GEL VAGINAL NIGHTLY
Qty: 70 G | Refills: 0 | Status: SHIPPED | OUTPATIENT
Start: 2024-04-24 | End: 2024-04-24

## 2024-04-24 RX ORDER — METRONIDAZOLE 7.5 MG/G
GEL VAGINAL
Qty: 70 G | Refills: 0 | Status: SHIPPED | OUTPATIENT
Start: 2024-04-24 | End: 2024-04-30 | Stop reason: ALTCHOICE

## 2024-04-26 ENCOUNTER — TELEPHONE (OUTPATIENT)
Dept: PRIMARY CARE | Facility: CLINIC | Age: 84
End: 2024-04-26
Payer: MEDICARE

## 2024-04-26 DIAGNOSIS — N89.8 VAGINAL DISCHARGE: Primary | ICD-10-CM

## 2024-04-26 RX ORDER — SECNIDAZOLE 2 G/4.8G
1 GRANULE ORAL ONCE
Qty: 1 EACH | Refills: 0 | Status: SHIPPED | OUTPATIENT
Start: 2024-04-26 | End: 2024-04-30 | Stop reason: ALTCHOICE
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
## 2024-04-30 ENCOUNTER — OFFICE VISIT (OUTPATIENT)
Dept: PRIMARY CARE | Facility: CLINIC | Age: 84
End: 2024-04-30
Payer: MEDICARE

## 2024-04-30 VITALS
HEIGHT: 63 IN | DIASTOLIC BLOOD PRESSURE: 64 MMHG | HEART RATE: 70 BPM | BODY MASS INDEX: 23.04 KG/M2 | OXYGEN SATURATION: 98 % | WEIGHT: 130 LBS | SYSTOLIC BLOOD PRESSURE: 130 MMHG

## 2024-04-30 DIAGNOSIS — F41.9 ANXIETY: ICD-10-CM

## 2024-04-30 DIAGNOSIS — F33.42 RECURRENT MAJOR DEPRESSIVE DISORDER, IN FULL REMISSION (CMS-HCC): ICD-10-CM

## 2024-04-30 DIAGNOSIS — I48.0 PAROXYSMAL ATRIAL FIBRILLATION (MULTI): ICD-10-CM

## 2024-04-30 DIAGNOSIS — N89.8 VAGINAL DISCHARGE: ICD-10-CM

## 2024-04-30 DIAGNOSIS — Z00.00 MEDICARE ANNUAL WELLNESS VISIT, SUBSEQUENT: Primary | ICD-10-CM

## 2024-04-30 DIAGNOSIS — Z78.0 ASYMPTOMATIC MENOPAUSAL STATE: ICD-10-CM

## 2024-04-30 DIAGNOSIS — M80.051D AGE-RELATED OSTEOPOROSIS WITH CURRENT PATHOL FRACTURE OF RIGHT FEMUR, WITH ROUTINE HEALING, SUBSEQUENT ENCOUNTER: ICD-10-CM

## 2024-04-30 DIAGNOSIS — Z12.31 ENCOUNTER FOR SCREENING MAMMOGRAM FOR BREAST CANCER: ICD-10-CM

## 2024-04-30 PROCEDURE — 1036F TOBACCO NON-USER: CPT | Performed by: FAMILY MEDICINE

## 2024-04-30 PROCEDURE — 1160F RVW MEDS BY RX/DR IN RCRD: CPT | Performed by: FAMILY MEDICINE

## 2024-04-30 PROCEDURE — 99214 OFFICE O/P EST MOD 30 MIN: CPT | Performed by: FAMILY MEDICINE

## 2024-04-30 PROCEDURE — 1170F FXNL STATUS ASSESSED: CPT | Performed by: FAMILY MEDICINE

## 2024-04-30 PROCEDURE — 1123F ACP DISCUSS/DSCN MKR DOCD: CPT | Performed by: FAMILY MEDICINE

## 2024-04-30 PROCEDURE — 1159F MED LIST DOCD IN RCRD: CPT | Performed by: FAMILY MEDICINE

## 2024-04-30 RX ORDER — PAROXETINE 30 MG/1
30 TABLET, FILM COATED ORAL DAILY
Qty: 90 TABLET | Refills: 1 | Status: SHIPPED | OUTPATIENT
Start: 2024-04-30

## 2024-04-30 RX ORDER — METRONIDAZOLE 500 MG/1
500 TABLET ORAL 2 TIMES DAILY
Qty: 14 TABLET | Refills: 0 | Status: SHIPPED | OUTPATIENT
Start: 2024-04-30 | End: 2024-05-07

## 2024-04-30 ASSESSMENT — ACTIVITIES OF DAILY LIVING (ADL)
DOING_HOUSEWORK: INDEPENDENT
DRESSING: INDEPENDENT
TAKING_MEDICATION: INDEPENDENT
GROCERY_SHOPPING: INDEPENDENT
MANAGING_FINANCES: INDEPENDENT
BATHING: INDEPENDENT

## 2024-04-30 ASSESSMENT — PATIENT HEALTH QUESTIONNAIRE - PHQ9
1. LITTLE INTEREST OR PLEASURE IN DOING THINGS: NOT AT ALL
SUM OF ALL RESPONSES TO PHQ9 QUESTIONS 1 AND 2: 0
2. FEELING DOWN, DEPRESSED OR HOPELESS: NOT AT ALL

## 2024-04-30 ASSESSMENT — COLUMBIA-SUICIDE SEVERITY RATING SCALE - C-SSRS
1. IN THE PAST MONTH, HAVE YOU WISHED YOU WERE DEAD OR WISHED YOU COULD GO TO SLEEP AND NOT WAKE UP?: NO
2. HAVE YOU ACTUALLY HAD ANY THOUGHTS OF KILLING YOURSELF?: NO

## 2024-07-01 ENCOUNTER — HOSPITAL ENCOUNTER (OUTPATIENT)
Dept: RADIOLOGY | Facility: CLINIC | Age: 84
Discharge: HOME | End: 2024-07-01
Payer: MEDICARE

## 2024-07-01 VITALS — BODY MASS INDEX: 23.05 KG/M2 | HEIGHT: 63 IN | WEIGHT: 130.07 LBS

## 2024-07-01 DIAGNOSIS — Z12.31 ENCOUNTER FOR SCREENING MAMMOGRAM FOR BREAST CANCER: ICD-10-CM

## 2024-07-01 PROCEDURE — 77067 SCR MAMMO BI INCL CAD: CPT | Mod: RIGHT SIDE | Performed by: RADIOLOGY

## 2024-07-01 PROCEDURE — 77063 BREAST TOMOSYNTHESIS BI: CPT | Mod: RIGHT SIDE | Performed by: RADIOLOGY

## 2024-07-01 PROCEDURE — 77067 SCR MAMMO BI INCL CAD: CPT | Mod: RT

## 2024-07-17 ENCOUNTER — HOSPITAL ENCOUNTER (OUTPATIENT)
Dept: RADIOLOGY | Facility: CLINIC | Age: 84
Discharge: HOME | End: 2024-07-17
Payer: MEDICARE

## 2024-07-17 DIAGNOSIS — Z78.0 ASYMPTOMATIC MENOPAUSAL STATE: ICD-10-CM

## 2024-07-17 PROCEDURE — 77080 DXA BONE DENSITY AXIAL: CPT

## 2024-07-17 ASSESSMENT — LIFESTYLE VARIABLES: CURRENT_SMOKER: N

## 2024-09-26 ENCOUNTER — PATIENT OUTREACH (OUTPATIENT)
Dept: PRIMARY CARE | Facility: CLINIC | Age: 84
End: 2024-09-26
Payer: MEDICARE

## 2024-11-04 ENCOUNTER — APPOINTMENT (OUTPATIENT)
Dept: PRIMARY CARE | Facility: CLINIC | Age: 84
End: 2024-11-04
Payer: MEDICARE

## 2025-01-11 DIAGNOSIS — F41.9 ANXIETY: ICD-10-CM

## 2025-01-12 RX ORDER — PAROXETINE 30 MG/1
30 TABLET, FILM COATED ORAL DAILY
Qty: 90 TABLET | Refills: 0 | Status: SHIPPED | OUTPATIENT
Start: 2025-01-12

## 2025-07-18 DIAGNOSIS — F41.9 ANXIETY: ICD-10-CM

## 2025-07-18 RX ORDER — PAROXETINE 30 MG/1
30 TABLET, FILM COATED ORAL DAILY
Qty: 90 TABLET | Refills: 0 | OUTPATIENT
Start: 2025-07-18